# Patient Record
Sex: FEMALE | Race: WHITE | NOT HISPANIC OR LATINO | Employment: FULL TIME | URBAN - METROPOLITAN AREA
[De-identification: names, ages, dates, MRNs, and addresses within clinical notes are randomized per-mention and may not be internally consistent; named-entity substitution may affect disease eponyms.]

---

## 2017-04-04 ENCOUNTER — HISTORICAL (OUTPATIENT)
Dept: LAB | Facility: HOSPITAL | Age: 47
End: 2017-04-04

## 2018-01-10 ENCOUNTER — HISTORICAL (OUTPATIENT)
Dept: RADIOLOGY | Facility: HOSPITAL | Age: 48
End: 2018-01-10

## 2021-05-12 ENCOUNTER — HISTORICAL (OUTPATIENT)
Dept: BARIATRICS | Facility: HOSPITAL | Age: 51
End: 2021-05-12

## 2021-05-26 ENCOUNTER — HISTORICAL (OUTPATIENT)
Dept: PREADMISSION TESTING | Facility: HOSPITAL | Age: 51
End: 2021-05-26

## 2021-05-26 LAB
EST. AVERAGE GLUCOSE BLD GHB EST-MCNC: 122.6 MG/DL
FT4I SERPL CALC-MCNC: 2.63 (ref 2.6–3.6)
HBA1C MFR BLD: 5.9 %
T3RU NFR SERPL: 24.94 % (ref 31–39)
T4 SERPL-MCNC: 10.53 UG/DL (ref 4.87–11.72)
TSH SERPL-ACNC: 2.7 UIU/ML (ref 0.35–4.94)

## 2021-07-15 ENCOUNTER — HISTORICAL (OUTPATIENT)
Dept: ADMINISTRATIVE | Facility: HOSPITAL | Age: 51
End: 2021-07-15

## 2021-07-27 ENCOUNTER — HISTORICAL (OUTPATIENT)
Dept: BARIATRICS | Facility: HOSPITAL | Age: 51
End: 2021-07-27

## 2021-08-17 ENCOUNTER — HISTORICAL (OUTPATIENT)
Dept: BARIATRICS | Facility: HOSPITAL | Age: 51
End: 2021-08-17

## 2021-09-09 ENCOUNTER — HISTORICAL (OUTPATIENT)
Dept: BARIATRICS | Facility: HOSPITAL | Age: 51
End: 2021-09-09

## 2021-09-13 ENCOUNTER — HISTORICAL (OUTPATIENT)
Dept: PREADMISSION TESTING | Facility: HOSPITAL | Age: 51
End: 2021-09-13

## 2021-09-13 LAB
ABS NEUT (OLG): 5.88 X10(3)/MCL (ref 2.1–9.2)
ALBUMIN SERPL-MCNC: 4.1 GM/DL (ref 3.5–5)
ALBUMIN/GLOB SERPL: 1.3 RATIO (ref 1.1–2)
ALP SERPL-CCNC: 67 UNIT/L (ref 40–150)
ALT SERPL-CCNC: 23 UNIT/L (ref 0–55)
APTT PPP: 30.6 SECOND(S) (ref 23.2–33.7)
AST SERPL-CCNC: 22 UNIT/L (ref 5–34)
BASOPHILS # BLD AUTO: 0 X10(3)/MCL (ref 0–0.2)
BASOPHILS NFR BLD AUTO: 0 %
BILIRUB SERPL-MCNC: 1.5 MG/DL
BILIRUBIN DIRECT+TOT PNL SERPL-MCNC: 0.5 MG/DL (ref 0–0.5)
BILIRUBIN DIRECT+TOT PNL SERPL-MCNC: 1 MG/DL (ref 0–0.8)
BUN SERPL-MCNC: 12 MG/DL (ref 9.8–20.1)
CALCIUM SERPL-MCNC: 9.9 MG/DL (ref 8.4–10.2)
CHLORIDE SERPL-SCNC: 104 MMOL/L (ref 98–107)
CO2 SERPL-SCNC: 25 MMOL/L (ref 22–29)
CREAT SERPL-MCNC: 0.78 MG/DL (ref 0.55–1.02)
EOSINOPHIL # BLD AUTO: 0.1 X10(3)/MCL (ref 0–0.9)
EOSINOPHIL NFR BLD AUTO: 1 %
ERYTHROCYTE [DISTWIDTH] IN BLOOD BY AUTOMATED COUNT: 13.6 % (ref 11.5–17)
GLOBULIN SER-MCNC: 3.2 GM/DL (ref 2.4–3.5)
GLUCOSE SERPL-MCNC: 90 MG/DL (ref 74–100)
HCT VFR BLD AUTO: 46 % (ref 37–47)
HGB BLD-MCNC: 14.6 GM/DL (ref 12–16)
LYMPHOCYTES # BLD AUTO: 2 X10(3)/MCL (ref 0.6–4.6)
LYMPHOCYTES NFR BLD AUTO: 23 %
MCH RBC QN AUTO: 27.7 PG (ref 27–31)
MCHC RBC AUTO-ENTMCNC: 31.7 GM/DL (ref 33–36)
MCV RBC AUTO: 87.1 FL (ref 80–94)
MONOCYTES # BLD AUTO: 0.5 X10(3)/MCL (ref 0.1–1.3)
MONOCYTES NFR BLD AUTO: 6 %
NEUTROPHILS # BLD AUTO: 5.88 X10(3)/MCL (ref 2.1–9.2)
NEUTROPHILS NFR BLD AUTO: 69 %
PLATELET # BLD AUTO: 251 X10(3)/MCL (ref 130–400)
PMV BLD AUTO: 12.8 FL (ref 9.4–12.4)
POTASSIUM SERPL-SCNC: 4.8 MMOL/L (ref 3.5–5.1)
PROT SERPL-MCNC: 7.3 GM/DL (ref 6.4–8.3)
RBC # BLD AUTO: 5.28 X10(6)/MCL (ref 4.2–5.4)
SODIUM SERPL-SCNC: 138 MMOL/L (ref 136–145)
WBC # SPEC AUTO: 8.6 X10(3)/MCL (ref 4.5–11.5)

## 2021-09-30 ENCOUNTER — HISTORICAL (OUTPATIENT)
Dept: BARIATRICS | Facility: HOSPITAL | Age: 51
End: 2021-09-30

## 2021-11-17 ENCOUNTER — HISTORICAL (OUTPATIENT)
Dept: BARIATRICS | Facility: HOSPITAL | Age: 51
End: 2021-11-17

## 2022-03-14 ENCOUNTER — HISTORICAL (OUTPATIENT)
Dept: BARIATRICS | Facility: HOSPITAL | Age: 52
End: 2022-03-14

## 2022-04-10 ENCOUNTER — HISTORICAL (OUTPATIENT)
Dept: ADMINISTRATIVE | Facility: HOSPITAL | Age: 52
End: 2022-04-10

## 2022-04-28 VITALS
DIASTOLIC BLOOD PRESSURE: 86 MMHG | WEIGHT: 248.69 LBS | OXYGEN SATURATION: 98 % | SYSTOLIC BLOOD PRESSURE: 131 MMHG | BODY MASS INDEX: 41.43 KG/M2 | HEIGHT: 65 IN

## 2022-04-30 NOTE — OP NOTE
Patient:   Jannie Fall            MRN: [id number]            FIN: [id number]               Age:   51 years     Sex:  Female     :  1970   Associated Diagnoses:   None   Author:   Boubacar Beasley MD        DATE OF SURGERY:    7/15/21    SURGEON:  Boubacar Beasley MD    PREOPERATIVE DIAGNOSIS:  Gastroesophageal reflux disease.    POSTOPERATIVE DIAGNOSES:    1. Gastroesophageal reflux disease.   2. Small hiatal hernia.    PROCEDURE:  Esophagogastroduodenoscopy with Biopsy.    ANESTHESIA:  IV Sedation    SPECIMENS REMOVED:  Antral Biopsy for yi test    FINDINGS:  yi test - Negative    INDICATIONS & SIGNIFICANT HISTORY:  The patient is a 51-year-old female who was being worked up for weight loss surgery.  The patient complained of gastroesophageal reflux disease.  Risks and benefits of elective endoscopy was discussed with the patient.  The patient voiced understanding of risks and benefits, and elected to proceed with surgery.    DESCRIPTION OF OPERATIVE PROCEDURE:  Once informed consents were obtained, the patient was taken to the Operating Room and placed supine on the operating table.  After MAC anesthesia was induced, the patient was then placed in left lateral decubitus position and the endoscope was used to enter the oropharynx down to the esophagus and into the stomach.  The stomach was then insufflated.  The scope was easily passed through the pylorus into the duodenum with no pathology appreciated.  The scope was then placed back into the stomach, and the antrum and body of the stomach appeared to have no significant pathology.  The scope was then retroflexed.  Upon visualization of the cardia and fundus, the patient appeared to have a small hiatal hernia.  The scope was then straightened.  An Antral biopsy was performed and tested for yi with rapid yi test.  No active bleeding was noted from biopsy site.  The insufflation was removed.  Upon visualization of the esophagus, once again  the small hiatal hernia was appreciated.  The Z-line appeared to have no inflammatory changes.  The scope was then removed.  The patient tolerated the procedure well and was transported to Recovery Room in good condition.

## 2022-06-08 ENCOUNTER — TELEPHONE (OUTPATIENT)
Dept: FAMILY MEDICINE | Facility: CLINIC | Age: 52
End: 2022-06-08

## 2022-06-08 NOTE — TELEPHONE ENCOUNTER
----- Message from Jeremy Hicks sent at 6/8/2022 11:53 AM CDT -----  .Type:  Needs Medical Advice    Who Called: Jannie  Symptoms (please be specific):    How long has patient had these symptoms:    Pharmacy name and phone #:    Would the patient rather a call back or a response via [facility]?   Best Call Back Number: [phone]  Additional Information: She would like to have nurse call her back, re: her x ray that was done yesterday if results were ready.

## 2022-06-16 ENCOUNTER — CLINICAL SUPPORT (OUTPATIENT)
Dept: BARIATRICS | Facility: HOSPITAL | Age: 52
End: 2022-06-16

## 2022-06-16 ENCOUNTER — CLINICAL SUPPORT (OUTPATIENT)
Dept: BARIATRICS | Facility: HOSPITAL | Age: 52
End: 2022-06-16
Payer: COMMERCIAL

## 2022-06-16 VITALS — WEIGHT: 171.63 LBS | BODY MASS INDEX: 28.94 KG/M2

## 2022-06-16 NOTE — PROGRESS NOTES
A 9 month F/U was conducted with Jannie via phone. Patient reports a verbal weight of 171 lbs.  She has lost approximately 118 lbs. Since weight loss surgery. She states that she is not exercising consistently. She has a gym membership and treadmill. She gets on the treadmill at times.    She is aware that she needs to make exercise a priority. She agreed to work on 3x/week of exercise.     It is my professional opinion that patient is in the contemplation stage of behavior change.    GATO Herzog, CPT, CHC

## 2022-06-16 NOTE — PROGRESS NOTES
Date:     POST OP BARIATRIC NUTRITION FOLLOW UP    Type of surgery: sleeve gastrectomy   Post-op visit:   [] 2 weeks  [] 4 weeks:  [] 2 months:  [] 4 months:  [] 6 months:  [x] 9 months:  [] 1 year:   [] Other:     Weight:  171    Post op complications:   [x] Yes [] No  If yes: [] Nausea   [] Vomiting   [] Constipation    [] Diarrhea    [] Other:     Dietary tolerance:  [] Yes [] No [] Comment:     Adequate fluid intake:  [x] Yes [] No Approx. daily fluid intake:   Adequate protein intake:  [x] Yes [] No  Approx. daily protein intake:    Vitamins/Minerals:   [x] MVI:    [] Biotin:  [x] Calcium:    [] Hair/Nails:  [] B 50 complex:   [] Bariatric Specific MVI:  [x] B12:    [] Bariatric Specific Calcium:  [] Iron:    [] Other:   [] Non-compliance:    Not taking a B50 she will start that.        Labs: NA   Comment:    Expected compliance:  [x]Good  []Fair  []Poor      Progress:   [x]Patient progressing well at this time with no complaints   [] Patient expressed complaint at this time: See additional notes       Bariatric Diet Education:   Verbalizes understanding Demonstrates  Needs further teaching Needs practice/ supervision Comments    Bariatric Clear [] [] [] []    Bariatric Full [] [] [] []    Bariatric Puree [] [] [] []    Bariatric Soft [] [] [] []    Bariatric Regular [x] [] [] []    Bariatric Reintroduction of Starchy CHO [] [] [] []    Bariatric: Mindful Eating [] [] [] []    Importance of Protein and Vitamin Protocol [x] [] [] []    Other:            Additional Notes:  Pt states that she is doing well at this time with no complaints. Pt wt  Is slowing down but she is still losing weight and being mindful of what she is eating, and focusing on protein and water. Pt was not taking a B complex but will add that into her vitamins.

## 2022-06-17 ENCOUNTER — TELEPHONE (OUTPATIENT)
Dept: FAMILY MEDICINE | Facility: CLINIC | Age: 52
End: 2022-06-17

## 2022-06-17 NOTE — TELEPHONE ENCOUNTER
----- Message from Jeremy Hicks sent at 6/17/2022 10:55 AM CDT -----  .Type:  Needs Medical Advice    Who Called: Ajnnie  Symptoms (please be specific):    How long has patient had these symptoms:    Pharmacy name and phone #:    Would the patient rather a call back or a response via [facility]?   Best Call Back Number: [phone]  Additional Information: She was returning a call from the office she told me.

## 2022-06-17 NOTE — TELEPHONE ENCOUNTER
I called the patient back.she had imaging done at [facility] in [place]. I called their office and requested imaging results be faxed to us. Patient was notified and verbalized understanding that we would be in touch with results once received

## 2022-06-22 ENCOUNTER — TELEPHONE (OUTPATIENT)
Dept: FAMILY MEDICINE | Facility: CLINIC | Age: 52
End: 2022-06-22

## 2022-06-22 NOTE — TELEPHONE ENCOUNTER
Outside XR from 6/7/22 received yesterday.  it shows some ununited coccygeal elements but radiologist states could be developmental or result of an old fracture.  Does not appear to be acute.     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     Continue on treatment plan as discussed in clinic. Contact clinic for concerns

## 2022-09-19 ENCOUNTER — CLINICAL SUPPORT (OUTPATIENT)
Dept: BARIATRICS | Facility: HOSPITAL | Age: 52
End: 2022-09-19
Payer: COMMERCIAL

## 2022-09-19 ENCOUNTER — CLINICAL SUPPORT (OUTPATIENT)
Dept: BARIATRICS | Facility: HOSPITAL | Age: 52
End: 2022-09-19

## 2022-09-19 VITALS — WEIGHT: 174 LBS | BODY MASS INDEX: 29.34 KG/M2

## 2022-09-19 NOTE — PROGRESS NOTES
Date:     POST OP BARIATRIC NUTRITION FOLLOW UP    Type of surgery: sleeve gastrectomy   Post-op visit:   [] 2 weeks  [] 4 weeks:  [] 2 months:  [] 4 months:  [] 6 months:  [] 9 months:  [x] 1 year:   [] Other:     Weight:  174lb    Post op complications:   [] Yes [x] No  If yes: [] Nausea   [] Vomiting   [] Constipation    [] Diarrhea    [] Other:     Dietary tolerance:  [] Yes [] No [] Comment:     Adequate fluid intake:  [] Yes [x] No Approx. daily fluid intake:  32oz   Adequate protein intake:  [x] Yes [] No  Approx. daily protein intake: 80g    Vitamins/Minerals:   [x] MVI:    [] Biotin:  [x] Calcium:    [] Hair/Nails:  [x] B 50 complex:   [] Bariatric Specific MVI:  [x] B12:    [] Bariatric Specific Calcium:  [] Iron:    [] Other:   [] Non-compliance:        Labs:  WNL  Comment:    Expected compliance:  [x]Good  []Fair  []Poor      Progress:   [x]Patient progressing well at this time with no complaints   [] Patient expressed complaint at this time: See additional notes       Bariatric Diet Education:   Verbalizes understanding Demonstrates  Needs further teaching Needs practice/ supervision Comments    Bariatric Clear [] [] [] []    Bariatric Full [] [] [] []    Bariatric Puree [] [] [] []    Bariatric Soft [] [] [] []    Bariatric Regular [] [] [] []    Bariatric Reintroduction of Starchy CHO [] [] [] []    Bariatric: Mindful Eating [] [] [] []    Importance of Protein and Vitamin Protocol [] [] [] []    Other:            Additional Notes:  Pt states that she is doing well and feels good   B: protein shake   S: p3 pack   L: packet tuna   D: 5 protein

## 2022-09-19 NOTE — PROGRESS NOTES
A 1 year visit was conducted with Jannie in the office.  A body composition was conducted and patient lost 95.7 lbs. And 45 inches since preop body composition was conducted.  From her highest weight, she has lost 118 lbs. She is walking for exercise. Patient was encouraged to continue living a healthy lifestyle and was given tips.      GATO Herzog, CPT, CHC

## 2023-04-13 ENCOUNTER — OFFICE VISIT (OUTPATIENT)
Dept: ORTHOPEDICS | Facility: CLINIC | Age: 53
End: 2023-04-13
Payer: COMMERCIAL

## 2023-04-13 ENCOUNTER — HOSPITAL ENCOUNTER (OUTPATIENT)
Dept: RADIOLOGY | Facility: CLINIC | Age: 53
Discharge: HOME | End: 2023-04-13
Attending: ORTHOPAEDIC SURGERY
Payer: COMMERCIAL

## 2023-04-13 VITALS
WEIGHT: 200 LBS | BODY MASS INDEX: 34.15 KG/M2 | SYSTOLIC BLOOD PRESSURE: 157 MMHG | HEART RATE: 84 BPM | HEIGHT: 64 IN | DIASTOLIC BLOOD PRESSURE: 98 MMHG

## 2023-04-13 DIAGNOSIS — M25.512 ACUTE PAIN OF LEFT SHOULDER: ICD-10-CM

## 2023-04-13 DIAGNOSIS — M75.102 ROTATOR CUFF SYNDROME OF LEFT SHOULDER: Primary | ICD-10-CM

## 2023-04-13 PROCEDURE — 20610 DRAIN/INJ JOINT/BURSA W/O US: CPT | Mod: LT,,, | Performed by: ORTHOPAEDIC SURGERY

## 2023-04-13 PROCEDURE — 1159F PR MEDICATION LIST DOCUMENTED IN MEDICAL RECORD: ICD-10-PCS | Mod: CPTII,,, | Performed by: ORTHOPAEDIC SURGERY

## 2023-04-13 PROCEDURE — 20610 LARGE JOINT ASPIRATION/INJECTION: L SUBACROMIAL BURSA: ICD-10-PCS | Mod: LT,,, | Performed by: ORTHOPAEDIC SURGERY

## 2023-04-13 PROCEDURE — 99204 OFFICE O/P NEW MOD 45 MIN: CPT | Mod: 25,,, | Performed by: ORTHOPAEDIC SURGERY

## 2023-04-13 PROCEDURE — 73030 X-RAY EXAM OF SHOULDER: CPT | Mod: LT,ICN,, | Performed by: ORTHOPAEDIC SURGERY

## 2023-04-13 PROCEDURE — 3008F BODY MASS INDEX DOCD: CPT | Mod: CPTII,,, | Performed by: ORTHOPAEDIC SURGERY

## 2023-04-13 PROCEDURE — 73030 XR SHOULDER COMPLETE 2 OR MORE VIEWS LEFT: ICD-10-PCS | Mod: LT,ICN,, | Performed by: ORTHOPAEDIC SURGERY

## 2023-04-13 PROCEDURE — 3008F PR BODY MASS INDEX (BMI) DOCUMENTED: ICD-10-PCS | Mod: CPTII,,, | Performed by: ORTHOPAEDIC SURGERY

## 2023-04-13 PROCEDURE — 3077F SYST BP >= 140 MM HG: CPT | Mod: CPTII,,, | Performed by: ORTHOPAEDIC SURGERY

## 2023-04-13 PROCEDURE — 1159F MED LIST DOCD IN RCRD: CPT | Mod: CPTII,,, | Performed by: ORTHOPAEDIC SURGERY

## 2023-04-13 PROCEDURE — 3077F PR MOST RECENT SYSTOLIC BLOOD PRESSURE >= 140 MM HG: ICD-10-PCS | Mod: CPTII,,, | Performed by: ORTHOPAEDIC SURGERY

## 2023-04-13 PROCEDURE — 1160F RVW MEDS BY RX/DR IN RCRD: CPT | Mod: CPTII,,, | Performed by: ORTHOPAEDIC SURGERY

## 2023-04-13 PROCEDURE — 99204 PR OFFICE/OUTPT VISIT, NEW, LEVL IV, 45-59 MIN: ICD-10-PCS | Mod: 25,,, | Performed by: ORTHOPAEDIC SURGERY

## 2023-04-13 PROCEDURE — 1160F PR REVIEW ALL MEDS BY PRESCRIBER/CLIN PHARMACIST DOCUMENTED: ICD-10-PCS | Mod: CPTII,,, | Performed by: ORTHOPAEDIC SURGERY

## 2023-04-13 PROCEDURE — 3080F DIAST BP >= 90 MM HG: CPT | Mod: CPTII,,, | Performed by: ORTHOPAEDIC SURGERY

## 2023-04-13 PROCEDURE — 3080F PR MOST RECENT DIASTOLIC BLOOD PRESSURE >= 90 MM HG: ICD-10-PCS | Mod: CPTII,,, | Performed by: ORTHOPAEDIC SURGERY

## 2023-04-13 RX ORDER — CALCIUM CARBONATE 600 MG
600 TABLET ORAL
COMMUNITY
Start: 2021-09-14

## 2023-04-13 RX ORDER — CHOLECALCIFEROL (VITAMIN D3) 25 MCG
1000 TABLET ORAL DAILY
COMMUNITY

## 2023-04-13 RX ORDER — PNV NO.95/FERROUS FUM/FOLIC AC 28MG-0.8MG
TABLET ORAL
COMMUNITY
Start: 2021-09-14

## 2023-04-13 RX ORDER — LIDOCAINE HYDROCHLORIDE 20 MG/ML
2 INJECTION, SOLUTION INFILTRATION; PERINEURAL
Status: DISCONTINUED | OUTPATIENT
Start: 2023-04-13 | End: 2023-04-13 | Stop reason: HOSPADM

## 2023-04-13 RX ORDER — BETAMETHASONE SODIUM PHOSPHATE AND BETAMETHASONE ACETATE 3; 3 MG/ML; MG/ML
6 INJECTION, SUSPENSION INTRA-ARTICULAR; INTRALESIONAL; INTRAMUSCULAR; SOFT TISSUE
Status: DISCONTINUED | OUTPATIENT
Start: 2023-04-13 | End: 2023-04-13 | Stop reason: HOSPADM

## 2023-04-13 RX ADMIN — BETAMETHASONE SODIUM PHOSPHATE AND BETAMETHASONE ACETATE 6 MG: 3; 3 INJECTION, SUSPENSION INTRA-ARTICULAR; INTRALESIONAL; INTRAMUSCULAR; SOFT TISSUE at 02:04

## 2023-04-13 RX ADMIN — LIDOCAINE HYDROCHLORIDE 2 MG: 20 INJECTION, SOLUTION INFILTRATION; PERINEURAL at 02:04

## 2023-04-13 NOTE — PROCEDURES
Large Joint Aspiration/Injection: L subacromial bursa    Date/Time: 4/13/2023 2:45 PM  Performed by: Fredrick Cardona MD  Authorized by: Fredrick Cardona MD     Consent Done?:  Yes (Verbal)  Indications:  Pain  Timeout: prior to procedure the correct patient, procedure, and site was verified    Prep: patient was prepped and draped in usual sterile fashion      Details:  Needle Size:  25 G  Approach:  Posterior  Location:  Shoulder  Site:  L subacromial bursa  Medications:  2 mg LIDOcaine HCL 20 mg/ml (2%) 20 mg/mL (2 %); 6 mg betamethasone acetate-betamethasone sodium phosphate 6 mg/mL  Patient tolerance:  Patient tolerated the procedure well with no immediate complications

## 2023-04-13 NOTE — PROGRESS NOTES
[facility]  Orthopedic Clinic Note      Chief Complaint:   Chief Complaint   Patient presents with    Left Shoulder - Pain    Shoulder Pain     has noticed over the last few months started feeling pain in left shoulder and has decressed ROM and weakness, does take tylenol for pain as needed     Referring Physician: No ref. provider found      History of Present Illness:    This is a 52 y.o. year old female presenting with left shoulder pain, worsening over the last 2 months.  She did sustain an injury at the end of last year when she was performing flies in the gym and felt a popping sensation in her shoulder.  This shoulder pain is moderate to severe.  Patient reports increased pain with overhead movement.  Patient reports night pain.  Patient reports anterolateral shoulder pain that radiates down the arm.  There is also pain over the trapezius.  She notes weakness in the arm as well.  She has been taking over the counter Tylenol as needed for the pain with minimal improvement in her symptoms.  She is unable to tolerate anti-inflammatory medications secondary to bariatric surgery performed in .       History reviewed. No pertinent past medical history.    Past Surgical History:   Procedure Laterality Date    ADENOIDECTOMY  1973    BARIATRIC SURGERY  2021     SECTION  10/21/2009    CHOLECYSTECTOMY   or     HERNIA REPAIR  2021    TONSILLECTOMY  1973       Current Outpatient Medications   Medication Sig    calcium carbonate (OS-MARTHA) 600 mg calcium (1,500 mg) Tab Take 600 mg by mouth.    cyanocobalamin (VITAMIN B-12) 100 MCG tablet Take by mouth.    pediatric multivitamin chewable tablet Take by mouth.    vitamin D (VITAMIN D3) 1000 units Tab Take 1,000 Units by mouth once daily.     No current facility-administered medications for this visit.       Review of patient's allergies indicates:  No Known Allergies    History reviewed. No pertinent family history.    Social History  "    Socioeconomic History    Marital status:    Tobacco Use    Smoking status: Former     Packs/day: 0.25     Years: 2.00     Pack years: 0.50     Types: Cigarettes     Start date: 1992     Quit date: 1994     Years since quittin.3    Smokeless tobacco: Never   Substance and Sexual Activity    Alcohol use: Never    Drug use: Never    Sexual activity: Yes     Partners: Male     Birth control/protection: None           Review of Systems:  All review of systems negative except for those stated in the HPI.    Examination:    Vital Signs:    Vitals:    23 1501 23 1503   BP:  (!) 157/98   Pulse:  84   Weight: 90.7 kg (200 lb)    Height: 5' 4" (1.626 m)    PainSc:    7       Body mass index is 34.33 kg/m².    Physical Examination:  General: Well-developed, well-nourished.  Neuro: Alert and oriented x 3.  Psych: Normal mood and affect.  Left Shoulder Exam:  No obvious deformity. No medial or lateral scapula winging. Forward flexion to 140 degrees and abduction to 140 degrees. Positive empty can, positive Whipple, Positive drop arm test, [place], impingement, Positive AC joint tenderness. Negative biceps groove tenderness, Positive Pisano´s, Yergason´s, Speed test. Negative Apprehension and Relocation test. 4/5 strength, normal skin appearance and palpable pulses distally. Sensibility normal.      Imaging: X-rays ordered and images interpreted today personally by me of 4 views of the left shoulder demonstrate no acute osseous pathology.     Assessment: Rotator cuff syndrome of left shoulder  -     Large Joint Aspiration/Injection: L subacromial bursa    Acute pain of left shoulder  -     X-Ray Shoulder 2 or More Views Left; Future; Expected date: 2023    Other orders  -     Cancel: Large Joint Aspiration/Injection        Plan: X-rays were reviewed with the patient.  We discussed multiple options, including continued observation, medications, corticosteroid injections, and physical " therapy.  Plan to proceed with left subacromial corticosteroid injection today.  This should provide her with some acute pain relief.  Home exercise program entered.  Continue over the counter medications as needed for pain.  She will return to clinic in approximately 6 weeks for re-evaluation.  If she remains symptomatic at that time, consider MRI of the shoulder to assess for suspected rotator cuff pathology.  She verbalized understanding of the plan of care with no further questions.    Avoid anti-inflammatories secondary to bariatric surgery.    Fredrick Cardona MD personally performed the services described in this documentation, including but not limited to patient's history, physical examination, and assessment and plan of care. All medical record entries made by YULIYA Cook were performed at his direction and in his presence. The medical record was reviewed and is accurate and complete.    Large Joint Aspiration/Injection: L subacromial bursa    Date/Time: 4/13/2023 2:45 PM  Performed by: Fredrick Cardona MD  Authorized by: Fredrick Cardona MD     Consent Done?:  Yes (Verbal)  Indications:  Pain  Timeout: prior to procedure the correct patient, procedure, and site was verified    Prep: patient was prepped and draped in usual sterile fashion      Details:  Needle Size:  25 G  Approach:  Posterior  Location:  Shoulder  Site:  L subacromial bursa  Medications:  2 mg LIDOcaine HCL 20 mg/ml (2%) 20 mg/mL (2 %); 6 mg betamethasone acetate-betamethasone sodium phosphate 6 mg/mL  Patient tolerance:  Patient tolerated the procedure well with no immediate complications       Clinical Reference Documents Added to Patient Instructions         Document    ROTATOR CUFF TEAR EXERCISES (ENGLISH)            Follow up in about 6 weeks (around 5/25/2023) for Reevaluation.      DISCLAIMER: This note may have been dictated using voice recognition software and may contain grammatical errors.     NOTE: Consult report sent to referring  provider via [employer] EMR.

## 2023-10-06 ENCOUNTER — TELEPHONE (OUTPATIENT)
Dept: SURGERY | Facility: CLINIC | Age: 53
End: 2023-10-06
Payer: COMMERCIAL

## 2023-10-16 ENCOUNTER — TELEPHONE (OUTPATIENT)
Dept: SURGERY | Facility: CLINIC | Age: 53
End: 2023-10-16
Payer: COMMERCIAL

## 2024-09-09 ENCOUNTER — TELEPHONE (OUTPATIENT)
Dept: SURGERY | Facility: CLINIC | Age: 54
End: 2024-09-09
Payer: COMMERCIAL

## 2025-07-18 ENCOUNTER — TELEPHONE (OUTPATIENT)
Dept: SURGERY | Facility: CLINIC | Age: 55
End: 2025-07-18
Payer: COMMERCIAL